# Patient Record
Sex: MALE | URBAN - METROPOLITAN AREA
[De-identification: names, ages, dates, MRNs, and addresses within clinical notes are randomized per-mention and may not be internally consistent; named-entity substitution may affect disease eponyms.]

---

## 2021-04-23 ENCOUNTER — OFFICE VISIT (OUTPATIENT)
Dept: URBAN - METROPOLITAN AREA CLINIC 63 | Facility: CLINIC | Age: 36
End: 2021-04-23
Payer: COMMERCIAL

## 2021-04-23 DIAGNOSIS — Z13.5 ENCOUNTER FOR SCREENING FOR EYE DISORDER: Primary | ICD-10-CM

## 2021-04-23 DIAGNOSIS — H52.13 MYOPIA, BILATERAL: ICD-10-CM

## 2021-04-23 PROCEDURE — 92004 COMPRE OPH EXAM NEW PT 1/>: CPT | Performed by: OPTOMETRIST

## 2021-04-23 ASSESSMENT — VISUAL ACUITY
OD: 20/25
OS: 20/25

## 2021-04-23 ASSESSMENT — KERATOMETRY
OS: 41.88
OD: 41.38

## 2021-04-23 ASSESSMENT — INTRAOCULAR PRESSURE
OD: 17
OS: 17

## 2021-04-23 NOTE — IMPRESSION/PLAN
Impression: Encounter for screening for eye disorder: Z13.5. Plan: Normal ocular health OU. Recommend glasses for driving for patients safety and safety of others. Observe.